# Patient Record
Sex: FEMALE | HISPANIC OR LATINO
[De-identification: names, ages, dates, MRNs, and addresses within clinical notes are randomized per-mention and may not be internally consistent; named-entity substitution may affect disease eponyms.]

---

## 2022-10-25 ENCOUNTER — FORM ENCOUNTER (OUTPATIENT)
Age: 62
End: 2022-10-25

## 2022-10-26 ENCOUNTER — APPOINTMENT (OUTPATIENT)
Dept: ORTHOPEDIC SURGERY | Facility: CLINIC | Age: 62
End: 2022-10-26

## 2022-10-26 ENCOUNTER — APPOINTMENT (OUTPATIENT)
Dept: MRI IMAGING | Facility: CLINIC | Age: 62
End: 2022-10-26

## 2022-10-26 VITALS — HEIGHT: 59 IN | WEIGHT: 148 LBS | BODY MASS INDEX: 29.84 KG/M2

## 2022-10-26 DIAGNOSIS — S43.421A SPRAIN OF RIGHT ROTATOR CUFF CAPSULE, INITIAL ENCOUNTER: ICD-10-CM

## 2022-10-26 PROBLEM — Z00.00 ENCOUNTER FOR PREVENTIVE HEALTH EXAMINATION: Status: ACTIVE | Noted: 2022-10-26

## 2022-10-26 PROCEDURE — 99203 OFFICE O/P NEW LOW 30 MIN: CPT

## 2022-10-26 PROCEDURE — 73221 MRI JOINT UPR EXTREM W/O DYE: CPT | Mod: RT

## 2022-10-26 RX ORDER — DICLOFENAC SODIUM 75 MG/1
75 TABLET, DELAYED RELEASE ORAL
Qty: 60 | Refills: 0 | Status: ACTIVE | COMMUNITY
Start: 2022-10-26 | End: 1900-01-01

## 2022-10-26 NOTE — ASSESSMENT
[FreeTextEntry1] : likely rotator cuff tear. continue in sling. will add diclofenac. Stat MRI f/u with shoulder specialist\par \par xray report from [facility]- no acute fracture or dislocation (images not available for our direct visualization)

## 2022-10-26 NOTE — PHYSICAL EXAM
[Right] : right shoulder [3 ___] : forward flexion 3[unfilled]/5 [3___] : internal rotation 3[unfilled]/5 [] : positive drop-arm test [FreeTextEntry3] : guarding in pain [FreeTextEntry9] : does not actively range. Passive to 100 can not hold it up  [de-identified] : n/a

## 2022-10-26 NOTE — HISTORY OF PRESENT ILLNESS
[10] : 10 [3] : 3 [de-identified] : 10-26-22- She is a right hand dominant female for evaluation of right shoulder pain limited range of motion and weakness. she was lifting a heavy pail earlier today and felt a pop in the right shoulder. INitially went to [facility] emergency room had xrays taken was told no fracture and placed in a sling. Presents for ortho consult.\par \par History is translated by her son [FreeTextEntry5] : pt c.o pain rt shoulder, states she was pulling on a garbage can today in the morning,

## 2022-10-31 ENCOUNTER — APPOINTMENT (OUTPATIENT)
Dept: ORTHOPEDIC SURGERY | Facility: CLINIC | Age: 62
End: 2022-10-31

## 2022-11-01 ENCOUNTER — APPOINTMENT (OUTPATIENT)
Dept: ORTHOPEDIC SURGERY | Facility: CLINIC | Age: 62
End: 2022-11-01

## 2022-11-01 VITALS — BODY MASS INDEX: 27.23 KG/M2 | HEIGHT: 62 IN | WEIGHT: 148 LBS

## 2022-11-01 DIAGNOSIS — Z78.9 OTHER SPECIFIED HEALTH STATUS: ICD-10-CM

## 2022-11-01 DIAGNOSIS — S46.811A STRAIN OF OTHER MUSCLES, FASCIA AND TENDONS AT SHOULDER AND UPPER ARM LEVEL, RIGHT ARM, INITIAL ENCOUNTER: ICD-10-CM

## 2022-11-01 DIAGNOSIS — S46.011A STRAIN OF MUSCLE(S) AND TENDON(S) OF THE ROTATOR CUFF OF RIGHT SHOULDER, INITIAL ENCOUNTER: ICD-10-CM

## 2022-11-01 PROCEDURE — 99214 OFFICE O/P EST MOD 30 MIN: CPT

## 2022-11-16 PROBLEM — Z78.9 NO PERTINENT PAST MEDICAL HISTORY: Status: ACTIVE | Noted: 2022-10-26

## 2022-11-16 NOTE — HISTORY OF PRESENT ILLNESS
[Gradual] : gradual [Sudden] : sudden [Dull/Aching] : dull/aching [Sharp] : sharp [Constant] : constant [Meds] : meds [de-identified] : 62 f tried to lift a garbage can and felt a tear in rihht shoulder. she visited hospital at [facility] and subsequently urgent care. notes she was subsequently seen in UC and referred for MRI . pain persists at anterior shoulder and is significant with movement. -night pain\par \par Patient is a non smoker [] : no [FreeTextEntry1] : Rt Shoulder  [FreeTextEntry5] : Pt states Rt Shoulder injured 10/26/2022 when picking up trash can. Pt states going to 10/26/2022 to the ER. Pt states taking pain killer but it relief temporary and as well dissimulator . Pt states doing movement she has discomfort. Pt state radiates pain upward to back  [de-identified] : L [de-identified] : 10/26/2022 [de-identified] : ER

## 2022-11-16 NOTE — DATA REVIEWED
[MRI] : MRI [Right] : of the right [Shoulder] : shoulder [I independently reviewed and interpreted images and report] : I independently reviewed and interpreted images and report [FreeTextEntry1] : full supra tear, partial subscap tear, partial infra tear. biceps subluxation. bursitis, mild glenohumeral arthrosis.

## 2022-11-16 NOTE — DISCUSSION/SUMMARY
[de-identified] : discussed options. discussed and recommend surgical arthroscopy, right shoulder EUA, possible FE, RCR, SAD, GHD, biceps tenotomy vs tenodesis, subscap repair. discussed RBA/rehab/recovery related to surgery. discussed nonsurgical manamgenet. discussed risks of elpidio with tenotomy, discussed progression of tear without repair. at this point patient wished to proceed with repair. \par \par PT in the meantime.\par \par \par ------------------------------------------------------------------------------------------------------------------------------------------------------\par \par The patient was advised of the diagnosis.  The natural history of the pathology was explained in full. All questions were answered.  The risks and benefits of conservative and interventional treatment alternatives were explained to the patient\par \par ------------------------------------------------------------------------------------------------------------------------------------------------------\par \par MRI(s) and/or other advanced imaging studies (CT/etc) were reviewed with the patient. Implications of the study(ies) together with the patient's clinical picture were discussed to formulate a working diagnosis and management options were detailed.\par \par ------------------------------------------------------------------------------------------------------------------------------------------------------\par \par The patient was advised of the diagnosis.  The natural history of the pathology was explained in full to the patient. All questions were answered.  The risks and benefits of surgical and non-surgical treatment were explained in full to the patient.  The patient demonstrated a full understanding of the surgical and non-surgical options.  The risks of surgery were outlined in full to the patient including but not limited to pain, stiffness, bleeding, scarring, infection, neurologic injury, vascular injury, failure to resolve symptoms, symptom recurrence, the need for further surgery, non-healing, implant failure, intraoperative fracture, wound breakdown, deep vein thrombosis, pulmonary embolism, anesthesia complications and even death.  The patient understood all the risks and accepted them and understood that other complications could occur that are not mentioned above.  The intraoperative plan, post-operative plan, post-operative expectations and limitations were explained in full.  Importance of postoperative rehabilitation and restriction compliance was explained and emphasized. Expectations from non-surgical treatment were explained in full as well.  The patient demonstrated a complete understanding of the treatment detailed, the risks and alternatives.\par \par

## 2022-11-16 NOTE — IMAGING
[de-identified] : \par ------------------------------------------------------------------------------------------------------------------------------------------------------\par \par Right shoulder exam: \par \par Skin: no significant pertinent finding\par Inspection: no obvious deformity, no obvious masses, no swelling, no effusion, no atrophy\par ROM: \par    FF: 60 active 120 passive, limited by pain\par    ER: 40\par    IR: T12\par Tenderness:\par    +Anterior/Biceps: \par    (-) Posterior\par    (-) Lateral\par    (-) Trapezius\par    (-) Scapula\par    (-) AC joint\par    (-) Crepitus with ROM\par Additional tests: \par    +Neer's\par    +Hawkin's\par Strength: \par    FF: 4-/5\par    ER: 4-/5\par    IR: 5/5\par    Biceps: 5/5\par    Triceps: 5/5\par    Distal: 5/5\par Neuro: In tact to light touch throughout\par Vascularity: Extremity warm and well perfused\par \par \par

## 2022-11-27 ENCOUNTER — TRANSCRIPTION ENCOUNTER (OUTPATIENT)
Age: 62
End: 2022-11-27

## 2022-12-01 ENCOUNTER — APPOINTMENT (OUTPATIENT)
Age: 62
End: 2022-12-01

## 2022-12-01 PROCEDURE — 29826 SHO ARTHRS SRG DECOMPRESSION: CPT | Mod: RT

## 2022-12-01 PROCEDURE — 29827 SHO ARTHRS SRG RT8TR CUF RPR: CPT | Mod: AS,RT

## 2022-12-01 PROCEDURE — 29821 SHO ARTHRS SRG COMPL SYNVCT: CPT | Mod: AS,59,RT

## 2022-12-01 PROCEDURE — 29826 SHO ARTHRS SRG DECOMPRESSION: CPT | Mod: AS,59,RT

## 2022-12-01 PROCEDURE — 29827 SHO ARTHRS SRG RT8TR CUF RPR: CPT | Mod: RT

## 2022-12-01 PROCEDURE — 29821 SHO ARTHRS SRG COMPL SYNVCT: CPT | Mod: 59,RT

## 2022-12-01 RX ORDER — OXYCODONE AND ACETAMINOPHEN 5; 325 MG/1; MG/1
5-325 TABLET ORAL
Qty: 30 | Refills: 0 | Status: COMPLETED | COMMUNITY
Start: 2022-12-01 | End: 2022-12-04

## 2022-12-08 RX ORDER — OXYCODONE AND ACETAMINOPHEN 5; 325 MG/1; MG/1
5-325 TABLET ORAL
Qty: 25 | Refills: 0 | Status: ACTIVE | COMMUNITY
Start: 2022-12-05 | End: 1900-01-01

## 2022-12-12 ENCOUNTER — APPOINTMENT (OUTPATIENT)
Dept: ORTHOPEDIC SURGERY | Facility: CLINIC | Age: 62
End: 2022-12-12

## 2022-12-12 VITALS — HEIGHT: 62 IN | BODY MASS INDEX: 27.23 KG/M2 | WEIGHT: 148 LBS

## 2022-12-12 PROCEDURE — 99024 POSTOP FOLLOW-UP VISIT: CPT

## 2022-12-12 NOTE — DISCUSSION/SUMMARY
[de-identified] : PT per protocoll. elbow/wrist/hand ROM. ice. lydia 4 ronna. \par \par \par \par ------------------------------------------------------------------------------------------------------------------------------------------------------\par \par The patient was advised of the diagnosis.  The natural history of the pathology was explained in full. All questions were answered.  The risks and benefits of conservative and interventional treatment alternatives were explained to the patient\par

## 2022-12-12 NOTE — REASON FOR VISIT
[FreeTextEntry2] : first post op right shoulder 12/1/22. Pain has improved since last week. No fevers/ Chills.

## 2022-12-12 NOTE — HISTORY OF PRESENT ILLNESS
[Gradual] : gradual [Sudden] : sudden [4] : 4 [Dull/Aching] : dull/aching [Sharp] : sharp [Frequent] : frequent [Meds] : meds [de-identified] : 62 f tried to lift a garbage can and felt a tear in rihht shoulder. she visited hospital at [facility] and subsequently urgent care. notes she was subsequently seen in UC and referred for MRI . pain persists at anterior shoulder and is significant with movement. -night pain\par \par Patient is a non smoker\par \par s/p right shoulder rotator cuff repair, GHD, SAD on 12/1/22 [] : no [FreeTextEntry1] : Rt Shoulder  [FreeTextEntry5] : Pt states Rt Shoulder injured 10/26/2022 when picking up trash can. Pt states going to 10/26/2022 to the ER. Pt states taking pain killer but it relief temporary and as well dissimulator . Pt states doing movement she has discomfort. Pt state radiates pain upward to back  [de-identified] : 10/26/2022 [de-identified] : ER  [de-identified] : 12/1/22 [de-identified] : 12/1/22 [de-identified] : right shoulder

## 2022-12-12 NOTE — IMAGING
[de-identified] : \par ------------------------------------------------------------------------------------------------------------------------------------------------------\par \par Right shoulder exam: \par \par Clear and dry incisions\par Sutures removed & steri strips placed \par \par Neuro: In tact to light touch throughout\par Vascularity: Extremity warm and well perfused\par \par \par

## 2022-12-30 ENCOUNTER — APPOINTMENT (OUTPATIENT)
Dept: ORTHOPEDIC SURGERY | Facility: CLINIC | Age: 62
End: 2022-12-30

## 2023-01-06 ENCOUNTER — APPOINTMENT (OUTPATIENT)
Dept: ORTHOPEDIC SURGERY | Facility: CLINIC | Age: 63
End: 2023-01-06
Payer: COMMERCIAL

## 2023-01-06 VITALS — BODY MASS INDEX: 27.23 KG/M2 | WEIGHT: 148 LBS | HEIGHT: 62 IN

## 2023-01-06 DIAGNOSIS — M75.21 BICIPITAL TENDINITIS, RIGHT SHOULDER: ICD-10-CM

## 2023-01-06 PROCEDURE — 99024 POSTOP FOLLOW-UP VISIT: CPT

## 2023-01-06 NOTE — HISTORY OF PRESENT ILLNESS
[Gradual] : gradual [Sudden] : sudden [4] : 4 [Dull/Aching] : dull/aching [Sharp] : sharp [Frequent] : frequent [Meds] : meds [de-identified] : 62 f tried to lift a garbage can and felt a tear in rihht shoulder. she visited hospital at [facility] and subsequently urgent care. notes she was subsequently seen in UC and referred for MRI . pain persists at anterior shoulder and is significant with movement. -night pain\par \par Patient is a non smoker\par \par s/p right shoulder rotator cuff repair, GHD, SAD on 12/1/22 [] : no [FreeTextEntry1] : Rt Shoulder  [FreeTextEntry5] : Pt states Rt Shoulder injured 10/26/2022 when picking up trash can. Pt states going to 10/26/2022 to the ER. Pt states taking pain killer but it relief temporary and as well dissimulator . Pt states doing movement she has discomfort. Pt state radiates pain upward to back  [de-identified] : 10/26/2022 [id number] - Disposition Day Code [de-identified] : ER  [de-identified] : 12/1/22 [de-identified] : 12/1/22 [de-identified] : right shoulder

## 2023-01-06 NOTE — REASON FOR VISIT
[FreeTextEntry2] : post op right shoulder 12/1/22. using ibuprofen. pain is improving. she started PT this week, because she was sick.

## 2023-01-06 NOTE — DISCUSSION/SUMMARY
[de-identified] : continue PT per protocol.\par focus elbow ROM and shoulder ROM\par \par ------------------------------------------------------------------------------------------------------------------------------------------------------\par \par The patient was advised of the diagnosis.  The natural history of the pathology was explained in full. All questions were answered.  The risks and benefits of conservative and interventional treatment alternatives were explained to the patient\par

## 2023-01-06 NOTE — IMAGING
[de-identified] : \par ------------------------------------------------------------------------------------------------------------------------------------------------------\par \par Right shoulder exam: \par incisions healed\par 50 p FF\par elbow stiffness\par Neuro: In tact to light touch throughout\par Vascularity: Extremity warm and well perfused\par \par \par

## 2023-02-10 ENCOUNTER — APPOINTMENT (OUTPATIENT)
Dept: ORTHOPEDIC SURGERY | Facility: CLINIC | Age: 63
End: 2023-02-10
Payer: COMMERCIAL

## 2023-02-10 VITALS — HEIGHT: 62 IN | WEIGHT: 148 LBS | BODY MASS INDEX: 27.23 KG/M2

## 2023-02-10 DIAGNOSIS — S69.81XA OTHER SPECIFIED INJURIES OF RIGHT WRIST, HAND AND FINGER(S), INITIAL ENCOUNTER: ICD-10-CM

## 2023-02-10 PROCEDURE — 99024 POSTOP FOLLOW-UP VISIT: CPT

## 2023-02-10 NOTE — DISCUSSION/SUMMARY
[de-identified] : rx: wrist brace. \par PT per protocl\par fu 6 weeks\par \par ------------------------------------------------------------------------------------------------------------------------------------------------------\par \par The patient was advised of the diagnosis.  The natural history of the pathology was explained in full. All questions were answered.  The risks and benefits of conservative and interventional treatment alternatives were explained to the patient\par

## 2023-02-10 NOTE — HISTORY OF PRESENT ILLNESS
[Gradual] : gradual [Sudden] : sudden [4] : 4 [Dull/Aching] : dull/aching [Sharp] : sharp [Frequent] : frequent [Meds] : meds [de-identified] : 62 f tried to lift a garbage can and felt a tear in rihht shoulder. she visited hospital at [facility] and subsequently urgent care. notes she was subsequently seen in UC and referred for MRI . pain persists at anterior shoulder and is significant with movement. -night pain\par \par Patient is a non smoker\par \par s/p right shoulder rotator cuff repair, GHD, SAD on 12/1/22 [] : no [FreeTextEntry1] : Rt Shoulder  [FreeTextEntry5] : Pt states Rt Shoulder injured 10/26/2022 when picking up trash can. Pt states going to 10/26/2022 to the ER. Pt states taking pain killer but it relief temporary and as well dissimulator . Pt states doing movement she has discomfort. Pt state radiates pain upward to back  [de-identified] : 10/26/2022 [de-identified] : ER  [de-identified] : 12/1/22 [de-identified] : 12/1/22 [de-identified] : right shoulder

## 2023-02-10 NOTE — IMAGING
[de-identified] : \par ------------------------------------------------------------------------------------------------------------------------------------------------------\par \par Right shoulder exam: \par incisions healed\par 50 p FF\par elbow stiffness\par Neuro: In tact to light touch throughout\par Vascularity: Extremity warm and well perfused\par \par \par

## 2023-03-24 ENCOUNTER — APPOINTMENT (OUTPATIENT)
Dept: ORTHOPEDIC SURGERY | Facility: CLINIC | Age: 63
End: 2023-03-24
Payer: COMMERCIAL

## 2023-03-24 VITALS — WEIGHT: 148 LBS | BODY MASS INDEX: 27.23 KG/M2 | HEIGHT: 62 IN

## 2023-03-24 PROCEDURE — 99214 OFFICE O/P EST MOD 30 MIN: CPT

## 2023-03-24 NOTE — IMAGING
[de-identified] : No erythema, no discharge, no increased warmth to touch. Incisions intact. /30/L5, strength testing deferred due to post-op status. Distally neurovascularly intact with median, radial, ulnar, MSC, axillary nerves intact. 2+ radial pulse with capillary refill >2 seconds\par

## 2023-03-24 NOTE — HISTORY OF PRESENT ILLNESS
[5] : 5 [0] : 0 [de-identified] : 3/24/23\par \par Pt is 4 months s/p surgery and reports still having some stiffness.  Pt has been doing therapy.  Pt is not working. [] : no [FreeTextEntry5] : NOTE FROM [facility] 02/10/23: 62 f tried to lift a garbage can and felt a tear in rihht shoulder. she visited hospital at [facility] and subsequently urgent care. notes she was subsequently seen in UC and referred for MRI. pain persists at anterior shoulder and is significant with movement. -night pain\par \par . [de-identified] : PT

## 2023-03-24 NOTE — DISCUSSION/SUMMARY
[de-identified] : Assessment & Plan: The patient is approximately 4 months s/p rotator cuff repair.  The patient is instructed in wound management. The patient's post-op plan, protocol and activity modifications have been thoroughly discussed and the patient expressed understanding. The patient will control pain as discussed & continue ice and elevation as needed. The patient otherwise may advance activity as discussed.\par Prescription Medications Ordered: [None]\par Physical Therapy: [Continue per protocol, new prescription given today, continue home exercise program]\par Braces/DME Ordered: [none]\par Activity/Work/Sports Status: [Out of work/gym/sports]\par Follow-Up: 2 months\par

## 2023-05-26 ENCOUNTER — APPOINTMENT (OUTPATIENT)
Dept: ORTHOPEDIC SURGERY | Facility: CLINIC | Age: 63
End: 2023-05-26

## 2023-06-05 ENCOUNTER — APPOINTMENT (OUTPATIENT)
Dept: ORTHOPEDIC SURGERY | Facility: CLINIC | Age: 63
End: 2023-06-05

## 2023-06-12 ENCOUNTER — APPOINTMENT (OUTPATIENT)
Dept: ORTHOPEDIC SURGERY | Facility: CLINIC | Age: 63
End: 2023-06-12

## 2023-06-23 ENCOUNTER — APPOINTMENT (OUTPATIENT)
Dept: ORTHOPEDIC SURGERY | Facility: CLINIC | Age: 63
End: 2023-06-23
Payer: COMMERCIAL

## 2023-06-23 PROCEDURE — 99213 OFFICE O/P EST LOW 20 MIN: CPT

## 2023-06-23 RX ORDER — METHYLPREDNISOLONE 4 MG/1
4 TABLET ORAL
Qty: 1 | Refills: 0 | Status: ACTIVE | COMMUNITY
Start: 2023-06-23 | End: 1900-01-01

## 2023-06-23 NOTE — IMAGING
[de-identified] : No erythema, no discharge, no increased warmth to touch. Incisions intact. /30/L5, strength testing deferred due to post-op status. Distally neurovascularly intact with median, radial, ulnar, MSC, axillary nerves intact. 2+ radial pulse with capillary refill >2 seconds\par

## 2023-06-23 NOTE — HISTORY OF PRESENT ILLNESS
[5] : 5 [0] : 0 [de-identified] : 6/23/23: \par Nearly 7 months s/p surgery. She continues to be stiff. Stopped PT last month as her insurance stopped covering it.  [] : no [FreeTextEntry5] : LEANNE 62 year old F here for ollow up RT shoulder [de-identified] : PT

## 2023-06-23 NOTE — DISCUSSION/SUMMARY
[de-identified] : Assessment & Plan: The patient is approximately 7 months s/p rotator cuff repair.  The patient is instructed in wound management. The patient's post-op plan, protocol and activity modifications have been thoroughly discussed and the patient expressed understanding. The patient will control pain as discussed & continue ice and elevation as needed. The patient otherwise may advance activity as discussed.\par Prescription Medications Ordered: [None]\par continue home exercise program]\par Braces/DME Ordered: [none]\par MDP rx \par Follow-Up next week with Dr. Barreto \par \par

## 2023-06-30 ENCOUNTER — APPOINTMENT (OUTPATIENT)
Dept: ORTHOPEDIC SURGERY | Facility: CLINIC | Age: 63
End: 2023-06-30
Payer: COMMERCIAL

## 2023-06-30 VITALS — BODY MASS INDEX: 27.23 KG/M2 | HEIGHT: 62 IN | WEIGHT: 148 LBS

## 2023-06-30 PROCEDURE — 99213 OFFICE O/P EST LOW 20 MIN: CPT

## 2023-06-30 NOTE — DISCUSSION/SUMMARY
[de-identified] : mri right shoudler eval cuff tear. \par SAC/IAC right shoulder based on findings. \par continue HEP. \par \par ----------------------------------------------------------------------------\par \par The patient was advised of the diagnosis.  The natural history of the pathology was explained in full. All questions were answered.  The risks and benefits of conservative and interventional treatment alternatives were explained to the patient\par

## 2023-06-30 NOTE — HISTORY OF PRESENT ILLNESS
[Gradual] : gradual [5] : 5 [1] : 2 [Burning] : burning [Dull/Aching] : dull/aching [Intermittent] : intermittent [Retired] : Work status: retired [de-identified] : This is LEANNE JONES, a 62 y.o. female who came in today complaining of rt shoulder pain. Pt did surgery with Dr. Barreto and is 7 months post op.  Pt saw Dr. Boss 06/20/23. [] : no [FreeTextEntry9] : ibuprofen [de-identified] : activites [de-identified] : 6/20/23 [de-identified] : tonie [de-identified] : december 1, 2022

## 2023-06-30 NOTE — IMAGING
[de-identified] : \par ----------------------------------------------------------------------------\par \par Right shoulder exam: \par \par Skin: no significant pertinent finding\par Inspection: no obvious deformity, no obvious masses, no swelling, no effusion, no atrophy\par ROM: \par    FF: 140a 160p\par    ER: 40\par    IR: lat hip\par Tenderness:\par    +Anterior/Biceps: \par    (-) Posterior\par    (-) Lateral\par    (-) Trapezius\par    (-) Scapula\par    (-) AC joint\par    (-) Crepitus with ROM\par Stability: \par    (-) Translation\par    (-) Apprehension\par    (-) Clicking\par Additional tests: \par    (-) Neer's\par    (-) Hawkin's\par    (-) English's\par    (-) Speed\par    (-) Cross chest adduction\par Strength: \par    FF: 5/5\par    ER: 5/5\par    IR: 5/5\par    Biceps: 5/5\par    Triceps: 5/5\par    Distal: 5/5\par Neuro: In tact to light touch throughout\par Vascularity: Extremity warm and well perfused\par \par

## 2023-07-12 ENCOUNTER — APPOINTMENT (OUTPATIENT)
Dept: MRI IMAGING | Facility: CLINIC | Age: 63
End: 2023-07-12

## 2023-07-31 ENCOUNTER — APPOINTMENT (OUTPATIENT)
Dept: ORTHOPEDIC SURGERY | Facility: CLINIC | Age: 63
End: 2023-07-31
Payer: COMMERCIAL

## 2023-07-31 VITALS — HEIGHT: 62 IN | WEIGHT: 148 LBS | BODY MASS INDEX: 27.23 KG/M2

## 2023-07-31 DIAGNOSIS — Z98.890 OTHER SPECIFIED POSTPROCEDURAL STATES: ICD-10-CM

## 2023-07-31 PROCEDURE — 99213 OFFICE O/P EST LOW 20 MIN: CPT | Mod: 25

## 2023-07-31 PROCEDURE — 20610 DRAIN/INJ JOINT/BURSA W/O US: CPT | Mod: RT

## 2023-07-31 PROCEDURE — J3490M: CUSTOM

## 2023-07-31 NOTE — HISTORY OF PRESENT ILLNESS
[Gradual] : gradual [5] : 5 [0] : 0 [Burning] : burning [Dull/Aching] : dull/aching [Intermittent] : intermittent [Retired] : Work status: retired [de-identified] : This is LEANNE JONES, a 62 y.o. female who came in today complaining of rt shoulder pain. Pt did surgery with Dr. Barreto and is 7 months post op.  Pt saw Dr. Boss 06/20/23. [] : no [FreeTextEntry9] : ibuprofen [de-identified] : activites [de-identified] : 6/20/23 [de-identified] : tonie [de-identified] : december 1, 2022 [de-identified] : none

## 2023-07-31 NOTE — REASON FOR VISIT
[Family Member] : family member [FreeTextEntry2] : Follow Up - Right Shoulder 7/31/23: Right Shoulder follow up. Continues HEP with mild help. MRI was denied.

## 2023-07-31 NOTE — IMAGING
[de-identified] : ----------------------------------------------------------------------------  Right shoulder exam:   Skin: no significant pertinent finding Inspection: no obvious deformity, no obvious masses, no swelling, no effusion, no atrophy ROM:     FF: 140a 160p    ER: 40    IR: lat hip Tenderness:    +Anterior/Biceps:     (-) Posterior    (-) Lateral    (-) Trapezius    (-) Scapula    (-) AC joint    (-) Crepitus with ROM Stability:     (-) Translation    (-) Apprehension    (-) Clicking Additional tests:     (-) Neer's    (-) Hawkin's    (-) English's    (-) Speed    (-) Cross chest adduction Strength:     FF: 5/5    ER: 5/5    IR: 5/5    Biceps: 5/5    Triceps: 5/5    Distal: 5/5 Neuro: In tact to light touch throughout Vascularity: Extremity warm and well perfused

## 2023-07-31 NOTE — DISCUSSION/SUMMARY
[de-identified] : SAC/IAC right shoulder based on findings.  continue HEP.  follow up 2-3 months. May consider reordering MRI then  ----------------------------------------------------------------------------  The patient was advised of the diagnosis.  The natural history of the pathology was explained in full. All questions were answered.  The risks and benefits of conservative and interventional treatment alternatives were explained to the patient  ----------------------------------------------------------------------------  Large joint corticosteroid injection given: Right shoulder subacromial/ IAC  Patient indicated for injection after trial of rest, OTC medications including aspirin, Ibuprofen, Aleve etc or prescription NSAIDS, and/or exercises at home and/ or physical therapy without satisfactory response.  Patient has symptoms including pain, swelling, and/or decreased mobility in the joint. The risks, benefits, and alternatives to corticosteroid injection were explained in full to the patient, including but not limited to infection, sepsis, bleeding, scarring, skin discoloration, temporary increase in pain, syncopal episode, failure to resolve symptoms, allergic reaction, symptom recurrence, and elevation of blood sugar in diabetics. Patient understood the risks. All questions were answered. After discussion of options, patient requested an injection.   Oral informed consent was obtained and sterile technique was utilized for the procedure including the preparation of the solutions used for the injection and betadine followed by alcohol prep to the injection site. Anesthesia was given with ethyl chloride sprayed topically. The injection was delivered. Patient tolerated the procedure well.   Post Procedure Instructions: Patient was advised to call if redness, pain, or fever occur and apply ice for 15 min on and 15 min off later today  Medications delivered: Kenalog: 10 mg, Lidocaine: 4 cc, Marcaine: 4 cc.

## 2023-10-31 ENCOUNTER — APPOINTMENT (OUTPATIENT)
Dept: ORTHOPEDIC SURGERY | Facility: CLINIC | Age: 63
End: 2023-10-31

## 2024-07-10 ENCOUNTER — APPOINTMENT (OUTPATIENT)
Dept: ORTHOPEDIC SURGERY | Facility: CLINIC | Age: 64
End: 2024-07-10
Payer: COMMERCIAL

## 2024-07-10 VITALS — BODY MASS INDEX: 27.23 KG/M2 | WEIGHT: 148 LBS | HEIGHT: 62 IN

## 2024-07-10 DIAGNOSIS — M46.1 SACROILIITIS, NOT ELSEWHERE CLASSIFIED: ICD-10-CM

## 2024-07-10 DIAGNOSIS — Z78.9 OTHER SPECIFIED HEALTH STATUS: ICD-10-CM

## 2024-07-10 DIAGNOSIS — M53.3 SACROCOCCYGEAL DISORDERS, NOT ELSEWHERE CLASSIFIED: ICD-10-CM

## 2024-07-10 PROCEDURE — 99204 OFFICE O/P NEW MOD 45 MIN: CPT

## 2024-07-10 PROCEDURE — 72170 X-RAY EXAM OF PELVIS: CPT

## 2024-07-10 PROCEDURE — 72100 X-RAY EXAM L-S SPINE 2/3 VWS: CPT

## 2024-07-10 RX ORDER — MELOXICAM 7.5 MG/1
7.5 TABLET ORAL
Qty: 30 | Refills: 0 | Status: ACTIVE | COMMUNITY
Start: 2024-07-10 | End: 1900-01-01

## 2024-08-21 ENCOUNTER — APPOINTMENT (OUTPATIENT)
Dept: ORTHOPEDIC SURGERY | Facility: CLINIC | Age: 64
End: 2024-08-21

## 2024-08-27 ENCOUNTER — APPOINTMENT (OUTPATIENT)
Dept: ORTHOPEDIC SURGERY | Facility: CLINIC | Age: 64
End: 2024-08-27
Payer: COMMERCIAL

## 2024-08-27 DIAGNOSIS — M53.3 SACROCOCCYGEAL DISORDERS, NOT ELSEWHERE CLASSIFIED: ICD-10-CM

## 2024-08-27 DIAGNOSIS — M46.1 SACROILIITIS, NOT ELSEWHERE CLASSIFIED: ICD-10-CM

## 2024-08-27 PROCEDURE — 99213 OFFICE O/P EST LOW 20 MIN: CPT

## 2024-08-27 NOTE — DATA REVIEWED
[FreeTextEntry1] : X-Ray Examination of the LUMBAR SPINE 2 views shows: no significant disc space narrowing  X-Ray Examination of the PELVIS 1 or 2 views shows: there are no fractures, subluxations or dislocations.

## 2024-08-27 NOTE — IMAGING
[de-identified] :  Patient ambulates with a normal gait. No Trendelenburg, antalgic gait. Pelvis:    Symphysis pubis: Stable, no tenderness to palpation  Palpable Hernias/Masses: None  Resisted Sit Up: Negative    Right Hip/Groin/Thigh:  INSPECTION:          Deformity: No           Erythema: No          Ecchymosis: No          Abrasions: No          Effusion: No          Groin mass/bulge: No         Palpable Hernia: No   PALPATION:          ASIS: Nontender          AIIS: Nontender          Greater Trochanter/IT-Band: Nontender         Anterior Trochanter: Nontender         Lateral Trochanter: Nontender         Posterior Trochanter: tender         [place] Crest: Nontender          Ischial Tuberosity: Nontender          Hip Flexor: Nontender          Quadriceps: Nontender          Proximal Hamstring Origin: Nontender          Proximal Hamstring Muscle-Tendon Junction: Nontender          Hamstring Muscle Belly: Nontender          ADductor :  Nontender           Lower Rectus Abdominis:  Nontender  Sacroiliac joint: TTP ROM:      Flexion: 0-120 degrees      Extension: 0-10 degrees      ABduction: 0-40 degrees      Adduction: 0-40 degrees      External Rotation: 0-40 degrees      Internal Rotation: 0-35 degrees  NEUROLOGIC EXAM:          Sensation L2-S1: Grossly Intact         Tinels: Negative  MOTOR EXAM:          Quadriceps: 5 out of 5          Hamstrings: 5 out of 5          ABduction: 5 out of 5          ADduction: 5 out of 5          Hip Flexion: 5 out of 5          TA: 5 out of 5          GS: 5 out of 5  PROVOCATIVE TESTING:       CHEMA TST: +      RAISSA'S TST: Negative       PIRIFORMIS TST: Negative       Straight Leg Raise:  Negative       Seated Straight Leg Raise: Negative       IMPINGEMENT TST: Negative       Resisted ADduction : Negative      Tredelenburg Sign: Negative      Bent-Knee Stretch Test: Negative      Plank Lift-off Test: Negative   Circulatory/Pulses:          Dorsalis Pedis:      2+          Posterior Tibialis: 2+

## 2024-08-27 NOTE — HISTORY OF PRESENT ILLNESS
[8] : 8 [5] : 5 [Dull/Aching] : dull/aching [Intermittent] : intermittent [de-identified] : Date of Injury/Onset: 07/10/2024 :LEANNE JONES , a 63 year old female, presents today for lumbar pain for 6 weeks no sudden trauma or injury. Was seen by PCP was given Ibuprofen and a referral to see specialist   Pain: At Rest: 5/10 With Activity: 8/10  Mechanism of injury: none  This is NOT a Work Related Injury being treated under Worker's Compensation. This is NOT an athletic injury occurring associated with an interscholastic or organized sports team. Quality of symptoms:  Improves with: Worse with: Prior treatment:na  Prior Imaging: na  Reports Available For Review Today: na  Out of work/sport:na School/Sport/Position/Occupation: none  Personal goal: Additional Information: denies fevers/chills/night sweats denies urinary or fecal incontinence denies falls  8/27/2024 following up for lumbar/hip pain, since last visit since reports 80% improvement.   [FreeTextEntry5] : Pt is here to follow up for L-spine. Feeling better. PT 2x a week, it is helpful.

## 2024-08-27 NOTE — DISCUSSION/SUMMARY
[de-identified] : R sacroilitis: REC: [facility] PT activity modification RTC 6 weeks  next visit: if no improvement MR R hip *** 8/27 R sacroilitis: REC: [facility] PT activity modification RTC 6 weeks  next visit: if no improvement MR R hip

## 2024-10-08 ENCOUNTER — APPOINTMENT (OUTPATIENT)
Dept: ORTHOPEDIC SURGERY | Facility: CLINIC | Age: 64
End: 2024-10-08